# Patient Record
Sex: MALE | Race: BLACK OR AFRICAN AMERICAN | NOT HISPANIC OR LATINO | URBAN - METROPOLITAN AREA
[De-identification: names, ages, dates, MRNs, and addresses within clinical notes are randomized per-mention and may not be internally consistent; named-entity substitution may affect disease eponyms.]

---

## 2023-01-25 ENCOUNTER — WEB ENCOUNTER (OUTPATIENT)
Dept: URBAN - METROPOLITAN AREA CLINIC 52 | Facility: CLINIC | Age: 73
End: 2023-01-25

## 2023-01-25 ENCOUNTER — OFFICE VISIT (OUTPATIENT)
Dept: URBAN - METROPOLITAN AREA CLINIC 52 | Facility: CLINIC | Age: 73
End: 2023-01-25
Payer: MEDICARE

## 2023-01-25 VITALS
SYSTOLIC BLOOD PRESSURE: 131 MMHG | TEMPERATURE: 96.3 F | HEART RATE: 76 BPM | BODY MASS INDEX: 24.22 KG/M2 | WEIGHT: 178.8 LBS | DIASTOLIC BLOOD PRESSURE: 86 MMHG | HEIGHT: 72 IN | OXYGEN SATURATION: 97 %

## 2023-01-25 DIAGNOSIS — R06.6 HICCUPS: ICD-10-CM

## 2023-01-25 PROCEDURE — 99203 OFFICE O/P NEW LOW 30 MIN: CPT | Performed by: PHYSICIAN ASSISTANT

## 2023-01-25 RX ORDER — CLOPIDOGREL BISULFATE 75 MG/1
TABLET, FILM COATED ORAL
Qty: 90 TABLET | Status: ACTIVE | COMMUNITY

## 2023-01-25 RX ORDER — SIMVASTATIN 40 MG/1
TABLET, FILM COATED ORAL
Qty: 90 TABLET | Status: ACTIVE | COMMUNITY

## 2023-01-25 RX ORDER — OLMESARTAN MEDOXOMIL AND HYDROCHLOROTHIAZIDE 40; 25 MG/1; MG/1
TABLET ORAL
Qty: 90 TABLET | Status: ON HOLD | COMMUNITY

## 2023-01-25 RX ORDER — METOCLOPRAMIDE 10 MG/1
TABLET ORAL
Qty: 40 TABLET | Status: ACTIVE | COMMUNITY

## 2023-01-25 RX ORDER — BACLOFEN 5 MG/1
1 TABLET TABLET ORAL THREE TIMES A DAY
Qty: 42 TABLET | Refills: 1 | OUTPATIENT
Start: 2023-01-25 | End: 2023-02-22

## 2023-01-25 RX ORDER — PANTOPRAZOLE SODIUM 20 MG/1
1 TABLET TABLET, DELAYED RELEASE ORAL ONCE A DAY
Qty: 14 TABLET | Refills: 0 | OUTPATIENT
Start: 2023-01-25

## 2023-01-25 RX ORDER — METOPROLOL SUCCINATE 50 MG/1
TABLET, EXTENDED RELEASE ORAL
Qty: 90 TABLET | Status: ACTIVE | COMMUNITY

## 2023-01-25 RX ORDER — METHOCARBAMOL 500 MG/1
TAKE 1 TABLET BY MOUTH EVERY DAY AS NEEDED TABLET, FILM COATED ORAL
Qty: 30 EACH | Refills: 0 | Status: ON HOLD | COMMUNITY

## 2023-01-25 NOTE — HPI-TODAY'S VISIT:
This is a 72 y.o. male who presents to office with persistent, intractable hiccups. Patient sates hiccups started at the end of July 2022 after a cold. Hiccups were non-stop for a week, then stopped; but restarted 2 months later. He was prescribed Metoclopramide, which usually resolves hiccups after 3 days.  Currently, he has had hiccups for 4 days, and Metoclopramide has not helped. He will occasionally have shortness of breath due to intractable hiccups.

## 2023-01-31 ENCOUNTER — TELEPHONE ENCOUNTER (OUTPATIENT)
Dept: URBAN - METROPOLITAN AREA CLINIC 52 | Facility: CLINIC | Age: 73
End: 2023-01-31

## 2023-02-02 ENCOUNTER — LAB OUTSIDE AN ENCOUNTER (OUTPATIENT)
Dept: URBAN - METROPOLITAN AREA CLINIC 52 | Facility: CLINIC | Age: 73
End: 2023-02-02

## 2023-02-02 ENCOUNTER — OFFICE VISIT (OUTPATIENT)
Dept: URBAN - METROPOLITAN AREA CLINIC 52 | Facility: CLINIC | Age: 73
End: 2023-02-02
Payer: MEDICARE

## 2023-02-02 VITALS
BODY MASS INDEX: 25.06 KG/M2 | HEIGHT: 72 IN | WEIGHT: 185 LBS | DIASTOLIC BLOOD PRESSURE: 92 MMHG | HEART RATE: 66 BPM | TEMPERATURE: 97.7 F | SYSTOLIC BLOOD PRESSURE: 159 MMHG

## 2023-02-02 DIAGNOSIS — R06.6 INTRACTABLE HICCUPS: ICD-10-CM

## 2023-02-02 PROCEDURE — 99213 OFFICE O/P EST LOW 20 MIN: CPT | Performed by: PHYSICIAN ASSISTANT

## 2023-02-02 RX ORDER — CLOPIDOGREL BISULFATE 75 MG/1
TABLET, FILM COATED ORAL
Qty: 90 TABLET | Status: ACTIVE | COMMUNITY

## 2023-02-02 RX ORDER — METOCLOPRAMIDE 10 MG/1
TABLET ORAL
Qty: 40 TABLET | Status: ACTIVE | COMMUNITY

## 2023-02-02 RX ORDER — PANTOPRAZOLE SODIUM 20 MG/1
1 TABLET TABLET, DELAYED RELEASE ORAL TWICE A DAY
Qty: 42 TABLET | Refills: 2 | OUTPATIENT
Start: 2023-02-02

## 2023-02-02 RX ORDER — PANTOPRAZOLE SODIUM 20 MG/1
1 TABLET TABLET, DELAYED RELEASE ORAL ONCE A DAY
Qty: 14 TABLET | Refills: 0 | Status: ACTIVE | COMMUNITY
Start: 2023-01-25

## 2023-02-02 RX ORDER — BACLOFEN 5 MG/1
1 TABLET TABLET ORAL THREE TIMES A DAY
Qty: 42 TABLET | Refills: 1 | Status: ACTIVE | COMMUNITY
Start: 2023-01-25 | End: 2023-02-22

## 2023-02-02 RX ORDER — BACLOFEN 5 MG/1
1 TABLET TABLET ORAL THREE TIMES A DAY
Qty: 42 TABLET | Refills: 2
Start: 2023-01-25 | End: 2023-03-16

## 2023-02-02 RX ORDER — SIMVASTATIN 40 MG/1
TABLET, FILM COATED ORAL
Qty: 90 TABLET | Status: ACTIVE | COMMUNITY

## 2023-02-02 NOTE — HPI-TODAY'S VISIT:
This is a 72 y.o. male who presents to office f/u on intractable hiccups. Patient sates hiccups started at the end of July 2022 after a cold. Hiccups were non-stop for a week, then stopped; but restarted 2 months later. He was prescribed Metoclopramide, which usually resolves hiccups after 3 days; however, at initially office visit, 4 days of Metoclopramide had not helped. He was prescribed  Protonix and Baclofen on his last office visit. Today, hiccups are much improved with Protonix 20mg and Baclofen. He says after 4 days of taking medication, hiccups completely resolved. But he says this is his usual cycle of the medication  helping and then hiccups returning after 1-2 months.

## 2023-02-11 LAB
A/G RATIO: 1.5
ALBUMIN: 4.3
ALKALINE PHOSPHATASE: 71
ALT (SGPT): 12
AST (SGOT): 19
BILIRUBIN, TOTAL: 0.7
BUN/CREATININE RATIO: 12
BUN: 15
CALCIUM: 9.9
CARBON DIOXIDE, TOTAL: 30
CHLORIDE: 99
CREATININE: 1.3
EGFR: 59
GLOBULIN, TOTAL: 2.8
GLUCOSE: 92
POTASSIUM: 3.8
PROTEIN, TOTAL: 7.1
SODIUM: 140

## 2023-04-28 ENCOUNTER — OFFICE VISIT (OUTPATIENT)
Dept: URBAN - METROPOLITAN AREA CLINIC 52 | Facility: CLINIC | Age: 73
End: 2023-04-28
Payer: MEDICARE

## 2023-04-28 VITALS
HEART RATE: 67 BPM | WEIGHT: 187 LBS | SYSTOLIC BLOOD PRESSURE: 147 MMHG | DIASTOLIC BLOOD PRESSURE: 84 MMHG | BODY MASS INDEX: 25.33 KG/M2 | OXYGEN SATURATION: 95 % | TEMPERATURE: 97.3 F | HEIGHT: 72 IN

## 2023-04-28 DIAGNOSIS — R06.6 INTRACTABLE HICCUPS: ICD-10-CM

## 2023-04-28 PROCEDURE — 99213 OFFICE O/P EST LOW 20 MIN: CPT | Performed by: PHYSICIAN ASSISTANT

## 2023-04-28 RX ORDER — PANTOPRAZOLE SODIUM 20 MG/1
1 TABLET TABLET, DELAYED RELEASE ORAL ONCE A DAY
Qty: 14 TABLET | Refills: 0 | Status: ACTIVE | COMMUNITY
Start: 2023-01-25

## 2023-04-28 RX ORDER — METOCLOPRAMIDE 10 MG/1
TABLET ORAL
Qty: 40 TABLET | Status: ACTIVE | COMMUNITY

## 2023-04-28 RX ORDER — PANTOPRAZOLE SODIUM 20 MG/1
1 TABLET TABLET, DELAYED RELEASE ORAL TWICE A DAY
Qty: 42 TABLET | Refills: 2 | Status: ACTIVE | COMMUNITY
Start: 2023-02-02

## 2023-04-28 RX ORDER — PANTOPRAZOLE SODIUM 20 MG/1
1 TABLET TABLET, DELAYED RELEASE ORAL TWICE A DAY
Qty: 28 TABLET | Refills: 1
Start: 2023-02-02

## 2023-04-28 RX ORDER — BACLOFEN 10 MG/1
1 TABLET AS NEEDED TABLET ORAL
Qty: 42 | Refills: 0 | OUTPATIENT
Start: 2023-04-28 | End: 2023-05-12

## 2023-04-28 RX ORDER — SIMVASTATIN 40 MG/1
TABLET, FILM COATED ORAL
Qty: 90 TABLET | Status: ACTIVE | COMMUNITY

## 2023-04-28 RX ORDER — CLOPIDOGREL BISULFATE 75 MG/1
TABLET, FILM COATED ORAL
Qty: 90 TABLET | Status: ACTIVE | COMMUNITY

## 2023-04-28 NOTE — HPI-TODAY'S VISIT:
This is a 72 y.o. male who presents to office f/u on intractable hiccups. Patient sates hiccups started at the end of July 2022 after a cold. Hiccups were non-stop for a week, then stopped; but restarted 2 months later. He was prescribed Metoclopramide, which usually resolves hiccups after 3 days; however, at initially office visit, 4 days of Metoclopramide had not helped. He was prescribed  Protonix and Baclofen on his last office visit, which resolved hiccups. Today, he says hiccups returned two days ago, and have continued. He is out of Protonix and needs refills, but continues to take Baclofen. He says he has a cycle of hiccups improving with medication, then returning after 1-2 months. CT of head 2/9/23 unremarkable.

## 2023-04-28 NOTE — PHYSICAL EXAM GASTROINTESTINAL
Abdomen , soft, nontender, nondistended , no guarding or rigidity , no masses palpable , normal bowel sounds , Liver and Spleen,  no hepatosplenomegaly , liver nontender, hiccups present

## 2023-05-01 ENCOUNTER — OFFICE VISIT (OUTPATIENT)
Dept: URBAN - METROPOLITAN AREA CLINIC 92 | Facility: CLINIC | Age: 73
End: 2023-05-01

## 2023-05-22 ENCOUNTER — TELEPHONE ENCOUNTER (OUTPATIENT)
Dept: URBAN - METROPOLITAN AREA CLINIC 52 | Facility: CLINIC | Age: 73
End: 2023-05-22

## 2023-05-22 RX ORDER — BACLOFEN 10 MG/1
1 TABLET AS NEEDED TABLET ORAL
Qty: 42 | Refills: 2
Start: 2023-04-28 | End: 2023-07-03

## 2023-05-22 RX ORDER — PANTOPRAZOLE SODIUM 20 MG/1
1 TABLET TABLET, DELAYED RELEASE ORAL TWICE A DAY
Qty: 28 TABLET | Refills: 1
Start: 2023-02-02

## 2023-06-15 ENCOUNTER — TELEPHONE ENCOUNTER (OUTPATIENT)
Dept: URBAN - METROPOLITAN AREA CLINIC 52 | Facility: CLINIC | Age: 73
End: 2023-06-15

## 2023-06-15 RX ORDER — BACLOFEN 10 MG/1
1 TABLET AS NEEDED TABLET ORAL
Qty: 42 | Refills: 2
Start: 2023-04-28 | End: 2023-07-27

## 2023-06-15 RX ORDER — PANTOPRAZOLE SODIUM 20 MG/1
1 TABLET TABLET, DELAYED RELEASE ORAL TWICE A DAY
Qty: 28 TABLET | Refills: 1
Start: 2023-02-02

## 2023-06-29 ENCOUNTER — WEB ENCOUNTER (OUTPATIENT)
Dept: URBAN - METROPOLITAN AREA CLINIC 52 | Facility: CLINIC | Age: 73
End: 2023-06-29

## 2023-06-29 ENCOUNTER — OFFICE VISIT (OUTPATIENT)
Dept: URBAN - METROPOLITAN AREA CLINIC 52 | Facility: CLINIC | Age: 73
End: 2023-06-29
Payer: MEDICARE

## 2023-06-29 VITALS
SYSTOLIC BLOOD PRESSURE: 137 MMHG | HEIGHT: 72 IN | TEMPERATURE: 97.5 F | DIASTOLIC BLOOD PRESSURE: 80 MMHG | HEART RATE: 45 BPM | WEIGHT: 185 LBS | BODY MASS INDEX: 25.06 KG/M2

## 2023-06-29 DIAGNOSIS — R06.6 HICCUPS: ICD-10-CM

## 2023-06-29 PROCEDURE — 99213 OFFICE O/P EST LOW 20 MIN: CPT | Performed by: PHYSICIAN ASSISTANT

## 2023-06-29 RX ORDER — BACLOFEN 10 MG/1
1 TABLET AS NEEDED TABLET ORAL
Qty: 42 | Refills: 2

## 2023-06-29 RX ORDER — CLOPIDOGREL BISULFATE 75 MG/1
TABLET, FILM COATED ORAL
Qty: 90 TABLET | Status: ACTIVE | COMMUNITY

## 2023-06-29 RX ORDER — SIMVASTATIN 40 MG/1
TABLET, FILM COATED ORAL
Qty: 90 TABLET | Status: ACTIVE | COMMUNITY

## 2023-06-29 RX ORDER — PANTOPRAZOLE SODIUM 20 MG/1
1 TABLET TABLET, DELAYED RELEASE ORAL TWICE A DAY
Qty: 28 TABLET | Refills: 1 | Status: DISCONTINUED | COMMUNITY
Start: 2023-02-02

## 2023-06-29 RX ORDER — PANTOPRAZOLE SODIUM 20 MG/1
1 TABLET TABLET, DELAYED RELEASE ORAL ONCE A DAY
Qty: 14 TABLET | Refills: 0 | Status: ACTIVE | COMMUNITY
Start: 2023-01-25

## 2023-06-29 RX ORDER — PANTOPRAZOLE SODIUM 20 MG/1
1 TABLET TABLET, DELAYED RELEASE ORAL TWICE A DAY
Qty: 28 TABLET | Refills: 1

## 2023-06-29 RX ORDER — BACLOFEN 10 MG/1
1 TABLET AS NEEDED TABLET ORAL
Qty: 42 | Refills: 2 | Status: ACTIVE | COMMUNITY
Start: 2023-04-28 | End: 2023-07-27

## 2023-06-29 RX ORDER — METOCLOPRAMIDE 10 MG/1
TABLET ORAL
Qty: 40 TABLET | Status: ACTIVE | COMMUNITY

## 2023-06-29 NOTE — HPI-TODAY'S VISIT:
This is a 72 y.o. male who presents to office f/u on intractable hiccups. Patient sates hiccups started at the end of July 2022 after a cold. Hiccups will stop for 4-5 weeks, then return and be non-stop for 4-5 days. He has failed Metoclopramide in the past. Baclofen and Protonix seem to help, but hiccups still last about 4 days even with medication.  Currently, he is not having hiccups. CT of head 2/9/23 unremarkable. He states his last colonoscopy was in 2008 and was normal. He was told to repeat procedure in 10 years (2018), but did not. He does want to schedule procedure, but needs to discuss with his son in [place] about timing (son in [place] will be his ).

## 2023-09-15 ENCOUNTER — OFFICE VISIT (OUTPATIENT)
Dept: URBAN - METROPOLITAN AREA CLINIC 52 | Facility: CLINIC | Age: 73
End: 2023-09-15
Payer: MEDICARE

## 2023-09-15 ENCOUNTER — LAB OUTSIDE AN ENCOUNTER (OUTPATIENT)
Dept: URBAN - METROPOLITAN AREA CLINIC 52 | Facility: CLINIC | Age: 73
End: 2023-09-15

## 2023-09-15 ENCOUNTER — DASHBOARD ENCOUNTERS (OUTPATIENT)
Age: 73
End: 2023-09-15

## 2023-09-15 ENCOUNTER — WEB ENCOUNTER (OUTPATIENT)
Dept: URBAN - METROPOLITAN AREA CLINIC 52 | Facility: CLINIC | Age: 73
End: 2023-09-15

## 2023-09-15 ENCOUNTER — TELEPHONE ENCOUNTER (OUTPATIENT)
Dept: URBAN - METROPOLITAN AREA CLINIC 94 | Facility: CLINIC | Age: 73
End: 2023-09-15

## 2023-09-15 VITALS
OXYGEN SATURATION: 97 % | TEMPERATURE: 97.7 F | HEIGHT: 72 IN | DIASTOLIC BLOOD PRESSURE: 80 MMHG | WEIGHT: 185 LBS | SYSTOLIC BLOOD PRESSURE: 138 MMHG | HEART RATE: 66 BPM | BODY MASS INDEX: 25.06 KG/M2

## 2023-09-15 DIAGNOSIS — Z12.11 COLON CANCER SCREENING: ICD-10-CM

## 2023-09-15 DIAGNOSIS — R06.6 INTRACTABLE HICCUPS: ICD-10-CM

## 2023-09-15 DIAGNOSIS — K21.9 GASTROESOPHAGEAL REFLUX DISEASE, UNSPECIFIED WHETHER ESOPHAGITIS PRESENT: ICD-10-CM

## 2023-09-15 PROBLEM — 235595009: Status: ACTIVE | Noted: 2023-09-15

## 2023-09-15 PROCEDURE — 99214 OFFICE O/P EST MOD 30 MIN: CPT | Performed by: PHYSICIAN ASSISTANT

## 2023-09-15 RX ORDER — SIMVASTATIN 40 MG/1
TABLET, FILM COATED ORAL
Qty: 90 TABLET | Status: ACTIVE | COMMUNITY

## 2023-09-15 RX ORDER — BACLOFEN 10 MG/1
1 TABLET AS NEEDED TABLET ORAL
Qty: 42 | Refills: 2 | Status: ACTIVE | COMMUNITY

## 2023-09-15 RX ORDER — PANTOPRAZOLE SODIUM 20 MG/1
1 TABLET TABLET, DELAYED RELEASE ORAL TWICE A DAY
Qty: 28 TABLET | Refills: 1 | Status: ACTIVE | COMMUNITY

## 2023-09-15 RX ORDER — BACLOFEN 10 MG/1
1 TABLET AS NEEDED TABLET ORAL
Qty: 42 | Refills: 2
End: 2023-10-27

## 2023-09-15 RX ORDER — PANTOPRAZOLE SODIUM 20 MG/1
1 TABLET TABLET, DELAYED RELEASE ORAL ONCE A DAY
Qty: 30 TABLET | Refills: 1
Start: 2023-01-25

## 2023-09-15 RX ORDER — CLOPIDOGREL BISULFATE 75 MG/1
TABLET, FILM COATED ORAL
Qty: 90 TABLET | Status: ACTIVE | COMMUNITY

## 2023-09-15 RX ORDER — METOCLOPRAMIDE 10 MG/1
TABLET ORAL
Qty: 40 TABLET | Status: ACTIVE | COMMUNITY

## 2023-09-15 RX ORDER — PANTOPRAZOLE SODIUM 20 MG/1
1 TABLET TABLET, DELAYED RELEASE ORAL ONCE A DAY
Qty: 14 TABLET | Refills: 0 | Status: ACTIVE | COMMUNITY
Start: 2023-01-25

## 2023-09-15 RX ORDER — POLYETHYLENE GLYCOL 3350, SODIUM SULFATE ANHYDROUS, SODIUM BICARBONATE, SODIUM CHLORIDE, POTASSIUM CHLORIDE 236; 22.74; 6.74; 5.86; 2.97 G/4L; G/4L; G/4L; G/4L; G/4L
DRINK 236 ML (8 OUNCES) POWDER, FOR SOLUTION ORAL AS DIRECTED
Qty: 1 | Refills: 0 | OUTPATIENT
Start: 2023-09-15 | End: 2023-09-16

## 2023-09-15 NOTE — HPI-TODAY'S VISIT:
This is a 72 y.o. male with h/o CABG in 2008 (on Plavix), Rheumatoid Arthritis, and GERD presents to office f/u on intractable hiccups. Patient sates hiccups started at the end of July 2022 after a cold. Hiccups will stop for 4-5 weeks, then return and be non-stop for 4-5 days. He has failed Metoclopramide in the past. Baclofen and Protonix has helped some, and he has seen improvement in the frequency and duration of hiccups.  Last episode was about 2 weeks ago. Currently, he is not having hiccups. CT of head 2/9/23 unremarkable. He states his last colonoscopy was in 2008 and was normal. He was told to repeat procedure in 10 years (2018), but did not. He does want to schedule procedure, but needs to discuss with his son in [place] about timing (son in [place] will be his ).
